# Patient Record
Sex: FEMALE | Race: WHITE | NOT HISPANIC OR LATINO | Employment: UNEMPLOYED | URBAN - METROPOLITAN AREA
[De-identification: names, ages, dates, MRNs, and addresses within clinical notes are randomized per-mention and may not be internally consistent; named-entity substitution may affect disease eponyms.]

---

## 2021-12-13 ENCOUNTER — HOSPITAL ENCOUNTER (EMERGENCY)
Facility: HOSPITAL | Age: 51
Discharge: HOME | End: 2021-12-13
Admitting: EMERGENCY MEDICINE

## 2021-12-13 VITALS
DIASTOLIC BLOOD PRESSURE: 98 MMHG | WEIGHT: 130 LBS | OXYGEN SATURATION: 98 % | RESPIRATION RATE: 18 BRPM | SYSTOLIC BLOOD PRESSURE: 205 MMHG | HEIGHT: 62 IN | HEART RATE: 110 BPM | BODY MASS INDEX: 23.92 KG/M2

## 2021-12-13 DIAGNOSIS — L01.00 IMPETIGO: Primary | ICD-10-CM

## 2021-12-13 PROCEDURE — 99284 EMERGENCY DEPT VISIT MOD MDM: CPT | Performed by: PHYSICIAN ASSISTANT

## 2021-12-13 PROCEDURE — 99283 EMERGENCY DEPT VISIT LOW MDM: CPT

## 2023-03-29 ENCOUNTER — APPOINTMENT (EMERGENCY)
Dept: RADIOLOGY | Facility: HOSPITAL | Age: 53
End: 2023-03-29

## 2023-03-29 ENCOUNTER — HOSPITAL ENCOUNTER (EMERGENCY)
Facility: HOSPITAL | Age: 53
Discharge: HOME | End: 2023-03-29
Attending: EMERGENCY MEDICINE

## 2023-03-29 VITALS
RESPIRATION RATE: 20 BRPM | HEART RATE: 88 BPM | TEMPERATURE: 98.1 F | OXYGEN SATURATION: 98 % | DIASTOLIC BLOOD PRESSURE: 95 MMHG | SYSTOLIC BLOOD PRESSURE: 173 MMHG

## 2023-03-29 DIAGNOSIS — M79.651 PAIN IN RIGHT THIGH: Primary | ICD-10-CM

## 2023-03-29 NOTE — ED PROVIDER NOTES
History  Chief Complaint   Patient presents with   • Groin Pain     Pt states she was on the treadmill about a year ago when she feels she pulled a muscle in her right groin  Pt states today the pain suddenly became worse and now radiates into her right leg      HPI    Prior to Admission Medications   Prescriptions Last Dose Informant Patient Reported? Taking?   mupirocin (BACTROBAN) 2 % ointment   No No   Sig: Apply topically 3 (three) times a day      Facility-Administered Medications: None       History reviewed  No pertinent past medical history  History reviewed  No pertinent surgical history  History reviewed  No pertinent family history  I have reviewed and agree with the history as documented      E-Cigarette/Vaping     E-Cigarette/Vaping Substances   • Nicotine No      Social History     Tobacco Use   • Smoking status: Every Day     Packs/day: 1 00     Types: Cigarettes   • Smokeless tobacco: Never   Substance Use Topics   • Alcohol use: Yes     Comment: couple bers a day   • Drug use: Never       Review of Systems    Physical Exam  Physical Exam    Vital Signs  ED Triage Vitals [03/29/23 0729]   Temperature Pulse Respirations Blood Pressure SpO2   98 1 °F (36 7 °C) (!) 118 18 (!) 195/111 98 %      Temp Source Heart Rate Source Patient Position - Orthostatic VS BP Location FiO2 (%)   Oral Monitor -- Right arm --      Pain Score       --           Vitals:    03/29/23 0729   BP: (!) 195/111   Pulse: (!) 118         Visual Acuity      ED Medications  Medications - No data to display    Diagnostic Studies  Results Reviewed     None                 XR femur 2 views RIGHT    (Results Pending)   XR hip/pelv 2-3 vws right if performed    (Results Pending)              Procedures  Procedures         ED Course                                             MDM    Disposition  Final diagnoses:   None     ED Disposition     None      Follow-up Information    None         Patient's Medications   Discharge Prescriptions    No medications on file       No discharge procedures on file      PDMP Review     None          ED Provider  Electronically Signed by femur 2 views RIGHT   ED Interpretation by Merline Contreras PA-C (03/29 0809)   No acute osseous abnormality      Final Result by Colby Marie MD (03/29 1283)      Severe right hip arthritis   No acute displaced fracture            Workstation performed: [id number]         XR hip/pelv 2-3 vws right if performed   ED Interpretation by Merline Contreras PA-C (03/29 0809)   No acute osseous abnormality      Final Result by Colby Marie MD (03/29 8658)      Severe right hip arthritis   No acute displaced fracture            Workstation performed: [id number]                    Procedures  Procedures         ED Course                                             Medical Decision Making  This is a 61-year-old female arriving with complaint of right inner thigh pain  Symptoms have been ongoing for 1 year, but she has not previously seen another provider this complaint  States that she had initially injured it while working out on her treadmill  No accompanying extremity paresthesias or weakness  Differential diagnosis includes but is not limited to: tendinitis, bursitis, muscle sprain/strain/spasm, osteoarthritis, djd    Initial ED plan: check xrays; offered ibuprofen which patient declined    Final ED Assessment: Vital signs reviewed on ED presentation, initially demonstrating tachycardia which had improved on reassessment of vital signs  Examination as above  The right lower extremity is neurovascularly intact  X-rays reviewed at bedside with patient and spouse  Discussed arthritis of the right hip joint, with no acute findings such as fracture/dislocation  Recommended continued supportive care including weightbearing as tolerated and gentle stretching, avoiding overexertion  Given Ace bandage for compression  Recommended Voltaren gel as needed for topical relief  Encouraged outpatient orthopedic follow-up for further evaluation and management which was included in her discharge paperwork  Parameters for ED return discussed at length  Patient verbalized understanding and was comfortable and agreeable with disposition and care plan  She was discharged in stable condition and had ambulated independently from the ED without issue  Amount and/or Complexity of Data Reviewed  Radiology: ordered and independent interpretation performed  Disposition  Final diagnoses:   Pain in right thigh     Time reflects when diagnosis was documented in both MDM as applicable and the Disposition within this note     Time User Action Codes Description Comment    3/29/2023  8:10 AM Nevaeh Barbosa Add [W28 008] Pain in right thigh       ED Disposition     ED Disposition   Discharge    Condition   Stable    Date/Time   Wed Mar 29, 2023  8:09 AM    Comment   Mario He discharge to home/self care  Follow-up Information     Follow up With Specialties Details Why Contact Info Additional [address] Orthopedic Surgery Call   [address] [id number] [facility], [address] [address] [place], [place], [address] Emergency Department Emergency Medicine  If symptoms worsen [facility] Emergency Department, [address] [place], [place], [facility]          Discharge Medication List as of 3/29/2023  8:11 AM      CONTINUE these medications which have NOT CHANGED    Details   mupirocin (BACTROBAN) 2 % ointment Apply topically 3 (three) times a day, Starting Mon 12/13/2021, Print             No discharge procedures on file      PDMP Review     None          ED Provider  Electronically Signed by           Merline Contreras PA-C  04/03/23 5081

## 2023-03-29 NOTE — DISCHARGE INSTRUCTIONS
Continue supportive care including rest and avoiding overactivity, gentle stretching, Voltaren gel for topical relief, Tylenol and ibuprofen, Ace bandage for compression, elevation  Outpatient PT/Ortho follow-up  Return immediately to the emergency department if you experience any new or worsening symptoms as discussed

## 2025-01-24 ENCOUNTER — OFFICE VISIT (OUTPATIENT)
Dept: OBGYN CLINIC | Facility: CLINIC | Age: 55
End: 2025-01-24
Payer: COMMERCIAL

## 2025-01-24 VITALS — HEIGHT: 62 IN | BODY MASS INDEX: 25.03 KG/M2 | WEIGHT: 136 LBS

## 2025-01-24 DIAGNOSIS — Z98.890 HISTORY OF THUMB SURGERY: Primary | ICD-10-CM

## 2025-01-24 PROCEDURE — 99203 OFFICE O/P NEW LOW 30 MIN: CPT | Performed by: ORTHOPAEDIC SURGERY

## 2025-01-24 RX ORDER — NORTRIPTYLINE HYDROCHLORIDE 10 MG/1
10 CAPSULE ORAL
COMMUNITY
Start: 2025-01-10 | End: 2025-03-11

## 2025-01-24 RX ORDER — LANOLIN ALCOHOL/MO/W.PET/CERES
1 CREAM (GRAM) TOPICAL DAILY
COMMUNITY
Start: 2024-10-18

## 2025-01-24 RX ORDER — GABAPENTIN 300 MG/1
300 CAPSULE ORAL 3 TIMES DAILY
COMMUNITY

## 2025-01-24 NOTE — PROGRESS NOTES
Assessment/Plan:   Diagnoses and all orders for this visit:    History of thumb surgery  -     Ambulatory Referral to Orthopedic Surgery; Future    Other orders  -     gabapentin (NEURONTIN) 300 mg capsule; Take 300 mg by mouth 3 (three) times a day  -     nortriptyline (PAMELOR) 10 mg capsule; Take 10 mg by mouth daily at bedtime Takes 2 tabs  -     vitamin B-12 (VITAMIN B-12) 1,000 mcg tablet; Take 1 tablet by mouth in the morning    The patient has a history of left thumb surgery from August 2024 with an outside provider. The patient continues to experience pain and lacks terminal extension at the MCP joint. Unfortunately, we were not able to obtain updated x-rays at time of visit. The patient was referred to orthopedic hand specialist for further evaluation and treatment.    The patient has hypertrophy along the MCP  joints of her bilateral thumbs.  There is however ulnar collateral ligament laxity more on the left than the right.  She already had surgery.  Would recommend getting a second opinion with an upper extremity specialist.  Look forward to hearing back once this is obtained    Subjective:   Patient ID: Trina Garcia  1970     HPI  Patient is a 54 y.o. female who presents for initial evaluation of her left thumb. The patient states that she had a surgery in August 2024, from  either a torn ligament or tendon, completed by Dr. Lynne at [facility]. The patient continues to have pain and soreness. She has an obvious deformity of the thumb. She denies numbness and tingling.     The following portions of the patient's history were reviewed and updated as appropriate:  Past medical history, past surgical history, Family history, social history, current medications and allergies    History reviewed. No pertinent past medical history.    Past Surgical History:   Procedure Laterality Date    CARPAL TUNNEL RELEASE Right     CERVICAL FUSION N/A 12/10/2024    HAND SURGERY N/A     TOTAL HIP ARTHROPLASTY  Right        No family history on file.    Social History     Socioeconomic History    Marital status:      Spouse name: None    Number of children: None    Years of education: None    Highest education level: None   Occupational History    None   Tobacco Use    Smoking status: Every Day     Current packs/day: 1.00     Types: Cigarettes    Smokeless tobacco: Never   Vaping Use    Vaping status: Some Days   Substance and Sexual Activity    Alcohol use: Yes     Comment: couple bers a day    Drug use: Never    Sexual activity: None   Other Topics Concern    None   Social History Narrative    None     Social Drivers of Health     Financial Resource Strain: Not At Risk (12/10/2024)    Received from [facility]    Financial Resource Strain     In the last 12 months did you skip medications to save money?: No     In the last 12 months, was there a time when you needed to see a doctor but could not because of cost?: No   Recent Concern: Financial Resource Strain - Medium Risk (11/7/2024)    Received from [facility]    Overall Financial Resource Strain (CARDIA)     Difficulty of Paying Living Expenses: Somewhat hard   Food Insecurity: Not At Risk (12/10/2024)    Received from [facility]    Food Insecurity     In the last 12 months did you ever eat less than you felt you should because there wasn't enough money for food?: No   Transportation Needs: Not At Risk (12/10/2024)    Received from [facility]    Transporation     In the last 12 months, have you ever had to go without healthcare because you didn't have a way to get there?: No   Physical Activity: Not on file   Stress: Not on file   Social Connections: Not At Risk (12/10/2024)    Received from [facility]    Social Connections     Do you often feel lonely?: No   Intimate Partner Violence: Not At Risk (11/7/2024)    Received from [facility]    Humiliation, Afraid, Rape, and Kick questionnaire     Fear of Current or Ex-Partner: No     Emotionally Abused: No     Physically Abused: No     Sexually Abused: No   Housing Stability: Not At Risk (12/10/2024)    Received from [facility]    Housing Stability     Are you worried that in the next 2 months you may not have stable housing?: No   Recent Concern: Housing Stability - High Risk (11/7/2024)    Received from [facility]    Housing Stability Vital Sign     Unable to Pay for Housing in the Last Year: Yes     Number of Times Moved in the Last Year: 0     Homeless in the Last Year: No         Current Outpatient Medications:     gabapentin (NEURONTIN) 300 mg capsule, Take 300 mg by mouth 3 (three) times a day, Disp: , Rfl:     nortriptyline (PAMELOR) 10 mg capsule, Take 10 mg by mouth daily at bedtime Takes 2 tabs, Disp: , Rfl:     vitamin B-12 (VITAMIN B-12) 1,000 mcg tablet, Take 1 tablet by mouth in the morning, Disp: , Rfl:     mupirocin (BACTROBAN) 2 % ointment, Apply topically 3 (three) times a day (Patient not taking: Reported on 1/24/2025), Disp: 15 g, Rfl: 0    No Known Allergies    Review of Systems   Constitutional:  Negative for chills, fever and unexpected weight change.   HENT:  Negative for hearing loss, nosebleeds and sore throat.    Eyes:  Negative for pain, redness and visual disturbance.   Respiratory:  Negative for cough, shortness of breath and wheezing.    Cardiovascular:  Negative for chest pain, palpitations and leg swelling.   Gastrointestinal:  Negative for abdominal pain, nausea and vomiting.   Endocrine: Negative for polydipsia and polyuria.   Genitourinary:  Negative for dysuria and hematuria.   Skin:  Negative for rash and wound.   Neurological:  Negative for dizziness, numbness and headaches.   Psychiatric/Behavioral:  Negative for decreased concentration and suicidal ideas. The patient is not nervous/anxious.    All other  "systems reviewed and are negative.       Objective:  Ht 5' 2\" (1.575 m)   Wt 61.7 kg (136 lb)   BMI 24.87 kg/m²     Ortho Exam  left Hand -    Skin is warm and dry to touch with no signs of erythema, ecchymosis, or infection  No soft tissue swelling or effusion noted  Full FDS, FDP, extensor mechanisms are intact  No rotational deformity with composite finger flexion  TTP over MCP joint, ulnar collateral ligament  Laxity with valgus stress.   Lack of terminal extension  Demonstrates normal wrist, elbow, and shoulder motion  Forearm compartments are soft and supple  2+ distal radial pulse with brisk capillary refill to the fingers  Radial, median, and ulnar motor and sensory distribution intact  Sensations light to touch intact distally      Physical Exam  HENT:      Head: Normocephalic and atraumatic.      Nose: Nose normal.   Eyes:      Conjunctiva/sclera: Conjunctivae normal.   Cardiovascular:      Rate and Rhythm: Normal rate.   Pulmonary:      Effort: Pulmonary effort is normal.   Musculoskeletal:      Cervical back: Neck supple.   Skin:     General: Skin is warm and dry.      Capillary Refill: Capillary refill takes less than 2 seconds.   Neurological:      Mental Status: She is alert and oriented to person, place, and time.   Psychiatric:         Mood and Affect: Mood normal.         Behavior: Behavior normal.          Diagnostic Test Review:  No imaging available at time of visit.     Procedures   None performed.     Scribe Attestation      I,:  Shirley Bradley am acting as a scribe while in the presence of the attending physician.:       I,:  Jaylon Grace, DO personally performed the services described in this documentation    as scribed in my presence.:              "

## 2025-03-05 ENCOUNTER — APPOINTMENT (OUTPATIENT)
Dept: RADIOLOGY | Facility: CLINIC | Age: 55
End: 2025-03-05
Payer: COMMERCIAL

## 2025-03-05 ENCOUNTER — OFFICE VISIT (OUTPATIENT)
Dept: OBGYN CLINIC | Facility: CLINIC | Age: 55
End: 2025-03-05
Payer: COMMERCIAL

## 2025-03-05 VITALS — BODY MASS INDEX: 25.03 KG/M2 | WEIGHT: 136 LBS | HEIGHT: 62 IN

## 2025-03-05 DIAGNOSIS — S63.629A COMPLETE TEAR OF RADIAL COLLATERAL LIGAMENT OF INTERPHALANGEAL JOINT OF THUMB: ICD-10-CM

## 2025-03-05 DIAGNOSIS — Z98.890 HISTORY OF THUMB SURGERY: ICD-10-CM

## 2025-03-05 DIAGNOSIS — S63.629A COMPLETE TEAR OF RADIAL COLLATERAL LIGAMENT OF INTERPHALANGEAL JOINT OF THUMB: Primary | ICD-10-CM

## 2025-03-05 PROCEDURE — 99203 OFFICE O/P NEW LOW 30 MIN: CPT | Performed by: SURGERY

## 2025-03-05 PROCEDURE — 73130 X-RAY EXAM OF HAND: CPT

## 2025-03-05 NOTE — PROGRESS NOTES
Hand Surgery History and Physical    03/05/25  2:44 PM  [id number]  Trina Garcia      HPI:  Pt is a 55 yo female who presents for evaluation of her left hand.  She has surgery in August 2024 which documents indicate was a left thumb RCL reconstruction with palmaris allograft.  She notes deformity of the thumb MCP joint along with discomfort.  She notes that she is not able to use her thumb very well.  She notes that her thumb was not this way prior to her surgery.      History reviewed. No pertinent past medical history.    Past Surgical History:   Procedure Laterality Date    CARPAL TUNNEL RELEASE Right     CERVICAL FUSION N/A 12/10/2024    HAND SURGERY N/A     TOTAL HIP ARTHROPLASTY Right        History reviewed. No pertinent family history.    Review of Systems - General ROS: negative  Ophthalmic ROS: negative  ENT ROS: negative  Respiratory ROS: negative  Cardiovascular ROS: negative  Neurological ROS: negative  Dermatological ROS: negative    There were no vitals filed for this visit.    Physical Examination:  General:  NAD  HEENT:  EOMI, perrla, normal ear morphology  Chest:  Unlabored breathing  Heart:  Regular pulse  Abd:  No distension  Extrem: LUE:  thumb MCP joint resting in flexion and ulnarly deviated, crepitus present on manipulation of MCP joint, able to reduce joint into more normal anatomic position but then subluxes back into above position, normal thumb IP flexion and extension, no active thumb MCP extension; scar over dorsal MCP joint level, SILT  Skin:  Dry, pink  Neuro:  AAOx3     Encounter Diagnoses   Name Primary?    History of thumb surgery     Complete tear of radial collateral ligament of interphalangeal joint of thumb Yes     Return if symptoms worsen or fail to improve.      Assessment & Plan  History of thumb surgery    Complete tear of radial collateral ligament of interphalangeal joint of thumb  Thumb is unstable at the MCP joint.    Plain films obtained and independently  reviewed:  MCP joint subluxed volarly and ulnarly, some mild arthritic changes, cystic changes consistent with tendon graft reconstruction on the ulnar side of the thumb metacarpal and proximal phalanx  Discussed treatment options.  Will start with a hand based splint to better position the thumb for daily activities. Therapy order placed for splint fabrication.   Discussed thumb MCP joint fusion as the main option in the future.    NSAIDs as needed for discomfort.    F/U PRN.

## 2025-03-06 ENCOUNTER — OFFICE VISIT (OUTPATIENT)
Dept: OCCUPATIONAL THERAPY | Facility: CLINIC | Age: 55
End: 2025-03-06
Payer: COMMERCIAL

## 2025-03-06 DIAGNOSIS — S63.629A COMPLETE TEAR OF RADIAL COLLATERAL LIGAMENT OF INTERPHALANGEAL JOINT OF THUMB: ICD-10-CM

## 2025-03-06 PROCEDURE — L3913 HFO W/O JOINTS CF: HCPCS

## 2025-03-06 NOTE — PROGRESS NOTES
Orthosis    Diagnosis:   1. Complete tear of radial collateral ligament of interphalangeal joint of thumb  Ambulatory Referral to PT/OT Hand Therapy        Indication:  Deformity of MCP joint    Location: Left  hand and thumb  Supplies: Custom Fit Orthotic  Orthosis type: Hand-Based SPICA  Wearing Schedule: Day Time as Tolerated  Describe Position: MP in slight extension and deviated to neutral in functional mid-abducted position    Precautions: Universal (skin contact/breakdown)    Patient or Caregiver expresses understanding of wearing Schedule and Precautions? Yes  Patient or Caregiver able to don/doff orthotic independently?Yes    Written orders provided to patient? Yes  Orders Obtained: Written  Orders Obtained from: Dr. Benítez    Return for evaluation and treatment  Pending f/u with surgeon

## 2025-05-14 ENCOUNTER — OFFICE VISIT (OUTPATIENT)
Dept: OBGYN CLINIC | Facility: CLINIC | Age: 55
End: 2025-05-14
Payer: COMMERCIAL

## 2025-05-14 VITALS — BODY MASS INDEX: 25.03 KG/M2 | HEIGHT: 62 IN | WEIGHT: 136 LBS

## 2025-05-14 DIAGNOSIS — S63.659A COMPLETE TEAR OF RADIAL COLLATERAL LIGAMENT OF METACARPOPHALANGEAL (MCP) JOINT OF FINGER: Primary | ICD-10-CM

## 2025-05-14 PROCEDURE — 99214 OFFICE O/P EST MOD 30 MIN: CPT | Performed by: SURGERY

## 2025-05-14 RX ORDER — SODIUM CHLORIDE, SODIUM LACTATE, POTASSIUM CHLORIDE, CALCIUM CHLORIDE 600; 310; 30; 20 MG/100ML; MG/100ML; MG/100ML; MG/100ML
20 INJECTION, SOLUTION INTRAVENOUS CONTINUOUS
OUTPATIENT
Start: 2025-05-14

## 2025-05-14 NOTE — PROGRESS NOTES
Hand Surgery History and Physical    05/14/25  1:11 PM  [id number]  Trina Garcia      HPI:  Pt is a 53 yo female who presents for follow-up evaluation of her left hand.  She has surgery in August 2024 which documents indicate was a left thumb RCL reconstruction with palmaris allograft by Dr. Lynne. Last visit a hand based splint was recommended.  Today she states the hand-based splint has worsened her thumb discomfort.  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    She is right-handed.    History reviewed. No pertinent past medical history.    Past Surgical History:   Procedure Laterality Date    CARPAL TUNNEL RELEASE Right     CERVICAL FUSION N/A 12/10/2024    HAND SURGERY N/A     TOTAL HIP ARTHROPLASTY Right        History reviewed. No pertinent family history.    Review of Systems - General ROS: negative  Ophthalmic ROS: negative  ENT ROS: negative  Respiratory ROS: negative  Cardiovascular ROS: negative  Neurological ROS: negative  Dermatological ROS: negative    Vitals:       Physical Examination:  General:  NAD  HEENT:  EOMI, perrla, normal ear morphology  Chest:  Unlabored breathing  Heart:  Regular pulse  Abd:  No distension  Extrem: LUE:  thumb MCP joint resting in flexion and ulnarly deviated, crepitus present on manipulation of MCP joint, able to reduce joint into more normal anatomic position but then subluxes back into above position, normal thumb IP flexion and extension, no active thumb MCP extension; scar over dorsal MCP joint level, SILT  Skin:  Dry, pink  Neuro:  AAOx3     Assessment & Plan  Complete tear of radial collateral ligament of metacarpophalangeal (MCP) joint of finger  Left thumb is unstable at MCP joint.  Recommend surgery - left thumb MCP joint fusion - given her functional loss and continued pain.  Patient wishes to pursue this option.  Risks, benefits and alternative treatments were discussed with the patient. These included but  are not limited to: bleeding, infection, damage to nerves, vessels or tendons, allergic reaction to agents, possible increase in pain, tendon or ligament rupture, weakening of bone or soft tissues, and/or elevation in blood sugar. Patient understands and would like to proceed with the proposed procedure.    F/U 2 weeks after surgery.

## 2025-05-22 ENCOUNTER — PREP FOR PROCEDURE (OUTPATIENT)
Dept: OBGYN CLINIC | Facility: CLINIC | Age: 55
End: 2025-05-22

## 2025-05-23 ENCOUNTER — ANESTHESIA EVENT (OUTPATIENT)
Age: 55
End: 2025-05-23
Payer: COMMERCIAL

## 2025-05-27 ENCOUNTER — OFFICE VISIT (OUTPATIENT)
Dept: LAB | Facility: HOSPITAL | Age: 55
End: 2025-05-27
Attending: SURGERY
Payer: COMMERCIAL

## 2025-05-27 ENCOUNTER — APPOINTMENT (OUTPATIENT)
Dept: LAB | Facility: HOSPITAL | Age: 55
End: 2025-05-27
Payer: COMMERCIAL

## 2025-05-27 DIAGNOSIS — S63.659A COMPLETE TEAR OF RADIAL COLLATERAL LIGAMENT OF METACARPOPHALANGEAL (MCP) JOINT OF FINGER: ICD-10-CM

## 2025-05-27 DIAGNOSIS — G95.9 MYELOPATHY (HCC): ICD-10-CM

## 2025-05-27 LAB
ANION GAP SERPL CALCULATED.3IONS-SCNC: 9 MMOL/L (ref 4–13)
ATRIAL RATE: 90 BPM
BUN SERPL-MCNC: 15 MG/DL (ref 5–25)
CALCIUM SERPL-MCNC: 10.2 MG/DL (ref 8.4–10.2)
CHLORIDE SERPL-SCNC: 103 MMOL/L (ref 96–108)
CO2 SERPL-SCNC: 30 MMOL/L (ref 21–32)
CREAT SERPL-MCNC: 0.82 MG/DL (ref 0.6–1.3)
ERYTHROCYTE [DISTWIDTH] IN BLOOD BY AUTOMATED COUNT: 13 % (ref 11.6–15.1)
FOLATE SERPL-MCNC: 7.7 NG/ML
GFR SERPL CREATININE-BSD FRML MDRD: 81 ML/MIN/1.73SQ M
GLUCOSE P FAST SERPL-MCNC: 96 MG/DL (ref 65–99)
HCT VFR BLD AUTO: 37 % (ref 34.8–46.1)
HGB BLD-MCNC: 12 G/DL (ref 11.5–15.4)
MCH RBC QN AUTO: 30.9 PG (ref 26.8–34.3)
MCHC RBC AUTO-ENTMCNC: 32.4 G/DL (ref 31.4–37.4)
MCV RBC AUTO: 95 FL (ref 82–98)
P AXIS: 41 DEGREES
PLATELET # BLD AUTO: 270 THOUSANDS/UL (ref 149–390)
PMV BLD AUTO: 9.5 FL (ref 8.9–12.7)
POTASSIUM SERPL-SCNC: 4.6 MMOL/L (ref 3.5–5.3)
PR INTERVAL: 160 MS
QRS AXIS: 23 DEGREES
QRSD INTERVAL: 88 MS
QT INTERVAL: 362 MS
QTC INTERVAL: 443 MS
RBC # BLD AUTO: 3.88 MILLION/UL (ref 3.81–5.12)
SODIUM SERPL-SCNC: 142 MMOL/L (ref 135–147)
T WAVE AXIS: 49 DEGREES
VENTRICULAR RATE: 90 BPM
VIT B12 SERPL-MCNC: 173 PG/ML (ref 180–914)
WBC # BLD AUTO: 5.14 THOUSAND/UL (ref 4.31–10.16)

## 2025-05-27 PROCEDURE — 82746 ASSAY OF FOLIC ACID SERUM: CPT

## 2025-05-27 PROCEDURE — 93010 ELECTROCARDIOGRAM REPORT: CPT | Performed by: INTERNAL MEDICINE

## 2025-05-27 PROCEDURE — 85027 COMPLETE CBC AUTOMATED: CPT

## 2025-05-27 PROCEDURE — 80048 BASIC METABOLIC PNL TOTAL CA: CPT

## 2025-05-27 PROCEDURE — 83918 ORGANIC ACIDS TOTAL QUANT: CPT

## 2025-05-27 PROCEDURE — 93005 ELECTROCARDIOGRAM TRACING: CPT

## 2025-05-27 PROCEDURE — 36415 COLL VENOUS BLD VENIPUNCTURE: CPT

## 2025-05-27 PROCEDURE — 82607 VITAMIN B-12: CPT

## 2025-05-28 ENCOUNTER — TELEPHONE (OUTPATIENT)
Dept: OBGYN CLINIC | Facility: CLINIC | Age: 55
End: 2025-05-28

## 2025-05-30 LAB — METHYLMALONATE SERPL-SCNC: 267 NMOL/L (ref 0–378)

## 2025-06-04 RX ORDER — ATORVASTATIN CALCIUM 10 MG/1
10 TABLET, FILM COATED ORAL 3 TIMES WEEKLY
COMMUNITY
Start: 2025-05-13

## 2025-06-04 RX ORDER — ACETAMINOPHEN 500 MG
500 TABLET ORAL EVERY 6 HOURS PRN
COMMUNITY

## 2025-06-04 RX ORDER — METHOCARBAMOL 750 MG/1
750 TABLET, FILM COATED ORAL AS NEEDED
Status: ON HOLD | COMMUNITY
Start: 2025-05-08 | End: 2025-06-06 | Stop reason: ALTCHOICE

## 2025-06-04 NOTE — PRE-PROCEDURE INSTRUCTIONS
Pre-Surgery Instructions:   Medication Instructions    acetaminophen (TYLENOL) 500 mg tablet Uses PRN- OK to take day of surgery    Cholecalciferol (VITAMIN D-3 PO) Last dose 6/4/25    gabapentin (NEURONTIN) 300 mg capsule Take day of surgery.    methocarbamol (ROBAXIN) 750 mg tablet Uses PRN- OK to take day of surgery    nortriptyline (PAMELOR) 10 mg capsule Take night before surgery    vitamin B-12 (VITAMIN B-12) 1,000 mcg tablet Hold day of surgery.takes per Neurology    Medication instructions for day of surgery reviewed. Please take all instructed medications with only a sip of water. Please do not take any over the counter (non-prescribed) vitamins or supplements for one week prior to date of surgery.      You will receive a call one business day prior to surgery with an arrival time and hospital directions. If your surgery is scheduled on a Monday, the hospital will be calling you on the Friday prior to your surgery. If you have not heard from anyone by 8pm, please call the hospital supervisor through the hospital  at [phone]. ([place] [phone] or [place] [phone]).    Do not eat or drink anything after midnight the night before your surgery, including candy, mints, lifesavers, or chewing gum. Do not drink alcohol 24hrs before your surgery. Try not to smoke at least 24hrs before your surgery.       Follow the pre surgery showering instructions as listed in the “My Surgical Experience Booklet” or otherwise provided by your surgeon's office. Do not use a blade to shave the surgical area 1 week before surgery. It is okay to use a clean electric clippers up to 24 hours before surgery. Do not apply any lotions, creams, including makeup, cologne, deodorant, or perfumes after showering on the day of your surgery. Do not use dry shampoo, hair spray, hair gel, or any type of hair products.     No contact lenses, eye make-up, or artificial eyelashes. Remove nail polish, including gel polish, and  any artificial, gel, or acrylic nails if possible. Remove all jewelry including rings and body piercing jewelry.     Wear causal clothing that is easy to take on and off. Consider your type of surgery.    Keep any valuables, jewelry, piercings at home. Please bring any specially ordered equipment (sling, braces) if indicated.    Arrange for a responsible person to drive you to and from the hospital on the day of your surgery. Please confirm the visitor policy for the day of your procedure when you receive your phone call with an arrival time.     Call the surgeon's office with any new illnesses, exposures, or additional questions prior to surgery.    Please reference your “My Surgical Experience Booklet” for additional information to prepare for your upcoming surgery.

## 2025-06-06 ENCOUNTER — APPOINTMENT (OUTPATIENT)
Age: 55
End: 2025-06-06
Payer: COMMERCIAL

## 2025-06-06 ENCOUNTER — HOSPITAL ENCOUNTER (OUTPATIENT)
Age: 55
Setting detail: OUTPATIENT SURGERY
Discharge: HOME | End: 2025-06-06
Attending: SURGERY | Admitting: SURGERY
Payer: COMMERCIAL

## 2025-06-06 ENCOUNTER — ANESTHESIA (OUTPATIENT)
Age: 55
End: 2025-06-06
Payer: COMMERCIAL

## 2025-06-06 VITALS
WEIGHT: 130.2 LBS | HEIGHT: 61 IN | BODY MASS INDEX: 24.58 KG/M2 | TEMPERATURE: 97.4 F | DIASTOLIC BLOOD PRESSURE: 97 MMHG | SYSTOLIC BLOOD PRESSURE: 144 MMHG | RESPIRATION RATE: 16 BRPM | OXYGEN SATURATION: 95 % | HEART RATE: 84 BPM

## 2025-06-06 DIAGNOSIS — S63.642D RUPTURE OF RADIAL COLLATERAL LIGAMENT OF LEFT THUMB, SUBSEQUENT ENCOUNTER: Primary | ICD-10-CM

## 2025-06-06 PROBLEM — R90.82 WHITE MATTER ABNORMALITY ON MRI OF BRAIN: Status: ACTIVE | Noted: 2025-02-04

## 2025-06-06 PROBLEM — F17.210 CIGARETTE NICOTINE DEPENDENCE WITHOUT COMPLICATION: Status: ACTIVE | Noted: 2023-03-30

## 2025-06-06 PROBLEM — R03.0 ELEVATED BP WITHOUT DIAGNOSIS OF HYPERTENSION: Status: ACTIVE | Noted: 2023-03-30

## 2025-06-06 PROBLEM — S63.642A RUPTURE OF RADIAL COLLATERAL LIGAMENT OF LEFT THUMB: Status: ACTIVE | Noted: 2024-08-23

## 2025-06-06 PROBLEM — E28.319 PREMATURE MENOPAUSE: Status: ACTIVE | Noted: 2023-03-30

## 2025-06-06 PROBLEM — M16.11 ARTHRITIS OF RIGHT HIP: Status: ACTIVE | Noted: 2023-03-30

## 2025-06-06 PROBLEM — F10.10 ETOH ABUSE: Status: ACTIVE | Noted: 2023-03-30

## 2025-06-06 PROBLEM — M50.30 DEGENERATIVE DISC DISEASE, CERVICAL: Status: ACTIVE | Noted: 2024-10-09

## 2025-06-06 PROBLEM — Z98.1 S/P CERVICAL SPINAL FUSION: Status: ACTIVE | Noted: 2024-12-10

## 2025-06-06 PROBLEM — G95.9 CERVICAL MYELOPATHY (HCC): Chronic | Status: ACTIVE | Noted: 2024-10-01

## 2025-06-06 PROBLEM — Z96.641 STATUS POST TOTAL HIP REPLACEMENT, RIGHT: Status: ACTIVE | Noted: 2023-05-17

## 2025-06-06 PROBLEM — G56.00 CARPAL TUNNEL SYNDROME: Status: ACTIVE | Noted: 2023-09-13

## 2025-06-06 PROBLEM — Z98.890 STATUS POST LUMBAR SPINE SURGERY FOR DECOMPRESSION OF SPINAL CORD: Status: ACTIVE | Noted: 2024-12-10

## 2025-06-06 PROCEDURE — 73140 X-RAY EXAM OF FINGER(S): CPT

## 2025-06-06 PROCEDURE — NC001 PR NO CHARGE: Performed by: SURGERY

## 2025-06-06 PROCEDURE — C1713 ANCHOR/SCREW BN/BN,TIS/BN: HCPCS | Performed by: SURGERY

## 2025-06-06 PROCEDURE — 64721 CARPAL TUNNEL SURGERY: CPT | Performed by: PHYSICIAN ASSISTANT

## 2025-06-06 PROCEDURE — 64718 REVISE ULNAR NERVE AT ELBOW: CPT | Performed by: SURGERY

## 2025-06-06 PROCEDURE — 26850 FUSION OF KNUCKLE: CPT | Performed by: SURGERY

## 2025-06-06 PROCEDURE — 64721 CARPAL TUNNEL SURGERY: CPT | Performed by: SURGERY

## 2025-06-06 PROCEDURE — 26850 FUSION OF KNUCKLE: CPT | Performed by: PHYSICIAN ASSISTANT

## 2025-06-06 PROCEDURE — 64718 REVISE ULNAR NERVE AT ELBOW: CPT | Performed by: PHYSICIAN ASSISTANT

## 2025-06-06 DEVICE — 3.0MM HEADLESS COMPRESSION SCREW-LONG THREAD 34MM: Type: IMPLANTABLE DEVICE | Site: FINGER | Status: FUNCTIONAL

## 2025-06-06 RX ORDER — DEXAMETHASONE SODIUM PHOSPHATE 10 MG/ML
INJECTION, SOLUTION INTRAMUSCULAR; INTRAVENOUS AS NEEDED
Status: DISCONTINUED | OUTPATIENT
Start: 2025-06-06 | End: 2025-06-06

## 2025-06-06 RX ORDER — BUPIVACAINE HYDROCHLORIDE 2.5 MG/ML
INJECTION, SOLUTION EPIDURAL; INFILTRATION; INTRACAUDAL; PERINEURAL AS NEEDED
Status: DISCONTINUED | OUTPATIENT
Start: 2025-06-06 | End: 2025-06-06 | Stop reason: HOSPADM

## 2025-06-06 RX ORDER — PROPOFOL 10 MG/ML
INJECTION, EMULSION INTRAVENOUS AS NEEDED
Status: DISCONTINUED | OUTPATIENT
Start: 2025-06-06 | End: 2025-06-06

## 2025-06-06 RX ORDER — ACETAMINOPHEN 325 MG/1
650 TABLET ORAL EVERY 6 HOURS PRN
Status: DISCONTINUED | OUTPATIENT
Start: 2025-06-06 | End: 2025-06-06 | Stop reason: HOSPADM

## 2025-06-06 RX ORDER — MAGNESIUM HYDROXIDE 1200 MG/15ML
LIQUID ORAL AS NEEDED
Status: DISCONTINUED | OUTPATIENT
Start: 2025-06-06 | End: 2025-06-06 | Stop reason: HOSPADM

## 2025-06-06 RX ORDER — SODIUM CHLORIDE, SODIUM LACTATE, POTASSIUM CHLORIDE, CALCIUM CHLORIDE 600; 310; 30; 20 MG/100ML; MG/100ML; MG/100ML; MG/100ML
20 INJECTION, SOLUTION INTRAVENOUS CONTINUOUS
Status: DISCONTINUED | OUTPATIENT
Start: 2025-06-06 | End: 2025-06-06 | Stop reason: HOSPADM

## 2025-06-06 RX ORDER — OXYCODONE HYDROCHLORIDE 5 MG/1
5 TABLET ORAL EVERY 4 HOURS PRN
Refills: 0 | Status: DISCONTINUED | OUTPATIENT
Start: 2025-06-06 | End: 2025-06-06 | Stop reason: HOSPADM

## 2025-06-06 RX ORDER — FENTANYL CITRATE 50 UG/ML
INJECTION, SOLUTION INTRAMUSCULAR; INTRAVENOUS AS NEEDED
Status: DISCONTINUED | OUTPATIENT
Start: 2025-06-06 | End: 2025-06-06

## 2025-06-06 RX ORDER — OXYCODONE AND ACETAMINOPHEN 5; 325 MG/1; MG/1
1 TABLET ORAL EVERY 12 HOURS PRN
Qty: 5 TABLET | Refills: 0 | Status: SHIPPED | OUTPATIENT
Start: 2025-06-06

## 2025-06-06 RX ORDER — NAPROXEN 500 MG/1
500 TABLET ORAL 2 TIMES DAILY WITH MEALS
Qty: 10 TABLET | Refills: 0 | Status: SHIPPED | OUTPATIENT
Start: 2025-06-06 | End: 2025-06-18 | Stop reason: SDUPTHER

## 2025-06-06 RX ORDER — FENTANYL CITRATE/PF 50 MCG/ML
50 SYRINGE (ML) INJECTION
Status: DISCONTINUED | OUTPATIENT
Start: 2025-06-06 | End: 2025-06-06 | Stop reason: HOSPADM

## 2025-06-06 RX ORDER — ALBUTEROL SULFATE 0.83 MG/ML
2.5 SOLUTION RESPIRATORY (INHALATION) ONCE AS NEEDED
Status: DISCONTINUED | OUTPATIENT
Start: 2025-06-06 | End: 2025-06-06 | Stop reason: HOSPADM

## 2025-06-06 RX ORDER — MIDAZOLAM HYDROCHLORIDE 2 MG/2ML
INJECTION, SOLUTION INTRAMUSCULAR; INTRAVENOUS AS NEEDED
Status: DISCONTINUED | OUTPATIENT
Start: 2025-06-06 | End: 2025-06-06

## 2025-06-06 RX ORDER — ONDANSETRON 2 MG/ML
INJECTION INTRAMUSCULAR; INTRAVENOUS AS NEEDED
Status: DISCONTINUED | OUTPATIENT
Start: 2025-06-06 | End: 2025-06-06

## 2025-06-06 RX ORDER — ONDANSETRON 2 MG/ML
4 INJECTION INTRAMUSCULAR; INTRAVENOUS ONCE AS NEEDED
Status: DISCONTINUED | OUTPATIENT
Start: 2025-06-06 | End: 2025-06-06 | Stop reason: HOSPADM

## 2025-06-06 RX ORDER — LIDOCAINE HYDROCHLORIDE 10 MG/ML
INJECTION, SOLUTION EPIDURAL; INFILTRATION; INTRACAUDAL; PERINEURAL AS NEEDED
Status: DISCONTINUED | OUTPATIENT
Start: 2025-06-06 | End: 2025-06-06

## 2025-06-06 RX ADMIN — SODIUM CHLORIDE, SODIUM LACTATE, POTASSIUM CHLORIDE, AND CALCIUM CHLORIDE: .6; .31; .03; .02 INJECTION, SOLUTION INTRAVENOUS at 12:18

## 2025-06-06 RX ADMIN — PROPOFOL 150 MG: 10 INJECTION, EMULSION INTRAVENOUS at 11:21

## 2025-06-06 RX ADMIN — LIDOCAINE HYDROCHLORIDE 50 MG: 10 INJECTION, SOLUTION EPIDURAL; INFILTRATION; INTRACAUDAL; PERINEURAL at 11:21

## 2025-06-06 RX ADMIN — DEXMEDETOMIDINE HYDROCHLORIDE 8 MCG: 100 INJECTION, SOLUTION INTRAVENOUS at 11:21

## 2025-06-06 RX ADMIN — FENTANYL CITRATE 25 MCG: 50 INJECTION INTRAMUSCULAR; INTRAVENOUS at 11:33

## 2025-06-06 RX ADMIN — ONDANSETRON 4 MG: 2 INJECTION INTRAMUSCULAR; INTRAVENOUS at 11:32

## 2025-06-06 RX ADMIN — OXYCODONE HYDROCHLORIDE 5 MG: 5 TABLET ORAL at 13:42

## 2025-06-06 RX ADMIN — DEXAMETHASONE SODIUM PHOSPHATE 5 MG: 10 INJECTION INTRAMUSCULAR; INTRAVENOUS at 11:32

## 2025-06-06 RX ADMIN — MIDAZOLAM 2 MG: 1 INJECTION INTRAMUSCULAR; INTRAVENOUS at 11:18

## 2025-06-06 RX ADMIN — FENTANYL CITRATE 50 MCG: 50 INJECTION INTRAMUSCULAR; INTRAVENOUS at 11:18

## 2025-06-06 RX ADMIN — SODIUM CHLORIDE, SODIUM LACTATE, POTASSIUM CHLORIDE, AND CALCIUM CHLORIDE 20 ML/HR: .6; .31; .03; .02 INJECTION, SOLUTION INTRAVENOUS at 09:31

## 2025-06-06 NOTE — ANESTHESIA PREPROCEDURE EVALUATION
Procedure:  LEFT THUMB METACARPOPHALANGEAL JOINT ARTHRODESIS, LT CTR & CUBITAL (Left: Finger)    Relevant Problems   MUSCULOSKELETAL   (+) Arthritis of right hip      Behavioral Health   (+) Cigarette nicotine dependence without complication   (+) ETOH abuse      Obstetrics/Gynecology   (+) Premature menopause      Neurology/Sleep   (+) Cervical myelopathy (HCC)   (+) S/P cervical spinal fusion   (+) Status post lumbar spine surgery for decompression of spinal cord   (+) White matter abnormality on MRI of brain      Rheumatology   (+) Complete tear of radial collateral ligament of metacarpophalangeal (MCP) joint of finger   (+) Degenerative disc disease, cervical   (+) Rupture of radial collateral ligament of left thumb      Orthopedic/Musculoskeletal   (+) Carpal tunnel syndrome   (+) Status post total hip replacement, right      Other   (+) Elevated BP without diagnosis of hypertension      Lab Results   Component Value Date    SODIUM 142 05/27/2025    K 4.6 05/27/2025     05/27/2025    CO2 30 05/27/2025    AGAP 9 05/27/2025    BUN 15 05/27/2025    CREATININE 0.82 05/27/2025    GLUC 121 (H) 11/26/2024    GLUF 96 05/27/2025    CALCIUM 10.2 05/27/2025    AST 17 10/16/2024    ALT 10 10/16/2024    ALKPHOS 71 10/16/2024    TP 7.6 10/16/2024    TBILI 0.4 10/16/2024    EGFR 81 05/27/2025     Lab Results   Component Value Date    WBC 5.14 05/27/2025    HGB 12.0 05/27/2025    HCT 37.0 05/27/2025    MCV 95 05/27/2025     05/27/2025         Physical Exam    Airway     Mallampati score: II  TM Distance: >3 FB  Neck ROM: full      Cardiovascular      Dental       Pulmonary      Neurological      Other Findings  post-pubertal.      Anesthesia Plan  ASA Score- 2     Anesthesia Type- general with ASA Monitors.         Additional Monitors:     Airway Plan: LMA and LMA.           Plan Factors-Exercise tolerance (METS): >4 METS.    Chart reviewed. EKG reviewed. Imaging results reviewed. Existing labs reviewed. Patient  summary reviewed.                  Induction-     Postoperative Plan- .   Monitoring Plan - Monitoring plan - standard ASA monitoring          Informed Consent- Anesthetic plan and risks discussed with patient.  I personally reviewed this patient with the CRNA. Discussed and agreed on the Anesthesia Plan with the CRNA..      NPO Status:  Vitals Value Taken Time   Date of last liquid 06/05/25 06/06/25 09:09   Time of last liquid 0730 06/06/25 09:09   Date of last solid 06/05/25 06/06/25 09:09   Time of last solid 2230 06/06/25 09:09

## 2025-06-06 NOTE — DISCHARGE INSTR - AVS FIRST PAGE
Special Instructions:  Rest left hand and elevate frequently.  Do not remove splint. All dressings/bandages to remain intact until your follow-up appointment.    OK to move the elbow.  May shower with protective cover on left hand.  Do not submerge.  No driving.  Follow-up in the office per your scheduled post-op appointment.

## 2025-06-06 NOTE — ANESTHESIA POSTPROCEDURE EVALUATION
EMERGENCY DEPARTMENT HISTORY AND PHYSICAL EXAM    2:34 PM      Date: 3/22/2025  Patient Name: Erika Haro    History of Presenting Illness     Chief Complaint   Patient presents with    Spasms     Pt arrives alert oriented via stretcher by EMS. C/o muscle spasms. PT was working out in the gym when she bean to cramp up.   Pt lakisha to speak however unable to move her lips and arms have been unable to go from straight out palm down position. Pt denies any falls or injuries        History From: Patient    Erika Haro is a 31 y.o. female   Patient is a 31-year-old female that denies any chance of being pregnant the presents emergency department with complaint of breathing rapidly and feeling numbness tingling in her arms or legs with twitching in her hands and feet while working out.  Patient such as doing squats, started feel bad, and is her breathing fast and could not move.  EMS was called and found the patient to have spasms of her hand and feet and has a difficult time speaking.  The patient also some numbness in her face.  The patient says has had before and denies a headache.  Patient says her symptoms started to slowly get better and denies any other aggravating alleviating factors.  Patient denies being a smoker, drinker, no drug user.  The patient was working out at her apartment complex.         Nursing Notes were all reviewed and agreed with or any disagreements were addressed in the HPI.    PCP: Tish Martin, ELISABETH - NP    Current Facility-Administered Medications   Medication Dose Route Frequency Provider Last Rate Last Admin    midazolam PF (VERSED) injection 1 mg  1 mg IntraVENous NOW Jarred Lee MD        sodium chloride 0.9 % bolus 1,000 mL  1,000 mL IntraVENous Once Jarred Lee .6 mL/hr at 03/22/25 1407 1,000 mL at 03/22/25 1407    0.9 % sodium chloride infusion   IntraVENous Continuous Jarred Lee MD         Current Outpatient Medications  Post-Op Assessment Note    CV Status:  Stable    Pain management: adequate       Mental Status:  Alert and awake   Hydration Status:  Euvolemic   PONV Controlled:  Controlled   Airway Patency:  Patent     Post Op Vitals Reviewed: Yes    No anethesia notable event occurred.    Staff: Anesthesiologist           Last Filed PACU Vitals:  Vitals Value Taken Time   Temp 97.4 °F (36.3 °C) 06/06/25 13:05   Pulse 84 06/06/25 13:05   /97 06/06/25 13:05   Resp 16 06/06/25 13:05   SpO2 95 % 06/06/25 13:05       Modified Elieser:     Vitals Value Taken Time   Activity 2 06/06/25 13:05   Respiration 2 06/06/25 13:05   Circulation 2 06/06/25 13:05   Consciousness 2 06/06/25 13:05   Oxygen Saturation 2 06/06/25 13:05     Modified Elieser Score: 10               No orders to display         Medical Decision Making   I am the first provider for this patient.    I reviewed the vital signs, available nursing notes, past medical history, past surgical history, family history and social history.    Vital Signs-Reviewed the patient's vital signs.      EKG: NSR at 89, no stemi, interpreted by me       ED Course: Progress Notes, Reevaluation, and Consults:    Provider Notes (Medical Decision Making):     MDM  Number of Diagnoses or Management Options  Carpopedal spasm  Hyperventilation  Near syncope  Diagnosis management comments: Patient is a 31-year-old female with a history of pregnancy in the past, migraines, the presents emergency department with complaint of arm and leg numbness and contractures after becoming short of breath.  Patient appeared to have carpopedal spasm likely from panic attack.  Will follow patient's electrolytes, basic labs, hydrate, give a dose of Versed, and then reevaluate.         The patient's symptoms are completely resolved so Versed was not needed according to the nurse.  Will continue to follow the patient closely.    Patient notes that she was doing a workout and was working up about 30 minutes was  on the elliptical machine.  She started to do some weights and started to feel lightheaded.  She said she weak and did not feel herself so she went to the car and called her child's father who works out frequently.  She was told she thought she had a pretty good workout and then she started to feel little short of breath and did not feel right so was concerned and then started to have numbness in her extremities, difficulty moving her hands and face and difficulty speaking so EMS was called.  That progressed to her hands feeling like he could not move and everything felt like it was alfredo and said this is never happened before.  Patient denies any new stress or anything else concerning her.  Patient is not on any oral contraception.  Patient

## 2025-06-06 NOTE — INTERVAL H&P NOTE
H&P reviewed. After examining the patient I find no changes in the patients condition since the H&P had been written.    Vitals:    06/06/25 0912   BP: 155/88   Pulse: 92   Resp: 16   Temp: 98 °F (36.7 °C)   SpO2: 98%

## 2025-06-06 NOTE — H&P
"Hand Surgery History and Physical     10:56 AM  6/6/2025  [id number]  Trina Garcia     CC:  left hand pain and numbness     HPI:  Pt is a 53 yo female who presents for follow-up evaluation of her left hand.  She has surgery in August 2024 which documents indicate was a left thumb RCL reconstruction with palmaris allograft by Dr. Lynne. Last visit a hand based splint was recommended.  Today she states the hand-based splint has worsened her thumb discomfort.  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    She is right-handed.     Past Medical History   History reviewed. No pertinent past medical history.        Past Surgical History         Past Surgical History:   Procedure Laterality Date    CARPAL TUNNEL RELEASE Right      CERVICAL FUSION N/A 12/10/2024    HAND SURGERY N/A      TOTAL HIP ARTHROPLASTY Right              Family History   History reviewed. No pertinent family history.     Allergies   Allergen Reactions    Dilaudid [Hydromorphone] GI Intolerance and Vomiting     \"A lot of vomiting\" and Nausea      Current Outpatient Medications   Medication Instructions    acetaminophen (TYLENOL) 500 mg, Every 6 hours PRN    atorvastatin (LIPITOR) 10 mg, Oral, 3 times weekly, Monday Wednesday and Friday    Cholecalciferol (VITAMIN D-3 PO) 1 tablet, Every morning    gabapentin (NEURONTIN) 1,200 mg, 3 times daily    nortriptyline (PAMELOR) 10 mg, Daily at bedtime    vitamin B-12 (VITAMIN B-12) 1,000 mcg tablet 1 tablet, Daily       Review of Systems - General ROS: negative  Ophthalmic ROS: negative  ENT ROS: negative  Respiratory ROS: negative  Cardiovascular ROS: negative  Neurological ROS: negative  Dermatological ROS: negative     Vitals:         Physical Examination:  General:  NAD  HEENT:  EOMI, perrla, normal ear morphology  Chest:  Unlabored breathing  Heart:  Regular pulse  Abd:  No distension  Extrem: LUE:  thumb MCP joint resting in flexion and ulnarly " deviated, crepitus present on manipulation of MCP joint, able to reduce joint into more normal anatomic position but then subluxes back into above position, normal thumb IP flexion and extension, no active thumb MCP extension; scar over dorsal MCP joint level, SILT  Skin:  Dry, pink  Neuro:  AAOx3      Assessment & Plan  Complete tear of radial collateral ligament of metacarpophalangeal (MCP) joint of finger  Left thumb is unstable at MCP joint.  Recommend surgery - left thumb MCP joint fusion - given her functional loss and continued pain.  Patient wishes to pursue this option.  Pt with left carpal tunnel syndrome and cubital tunnel syndrome on EMG since last visit.  Pt called and wished to add carpal tunnel and cubital tunnel releases  New consent obtained today    F/U 2 weeks after surgery.

## 2025-06-06 NOTE — OP NOTE
OPERATIVE REPORT  PATIENT NAME: Trina Garcia    :  1970  MRN: [id number]  Pt Location: [facility] OR ROOM 06    SURGERY DATE: 2025    Surgeons and Role:     * Cain Benítez MD - Primary     * Ciro Bahena PA-C - Assisting    Preop Diagnosis:  Complete tear of radial collateral ligament of metacarpophalangeal (MCP) joint of finger [S63.659A]    Post-Op Diagnosis Codes:     * Complete tear of radial collateral ligament of metacarpophalangeal (MCP) joint of finger [S63.010R]    Procedure(s):  Left - LEFT THUMB METACARPOPHALANGEAL JOINT ARTHRODESIS; LEFT CARPAL TUNNEL RELEASE & CUBITAL TUNNEL RELEASE    Specimen(s):  * No specimens in log *    Estimated Blood Loss:   Minimal    Drains:  * No LDAs found *    Anesthesia Type:   General    Operative Indications:  Complete tear of radial collateral ligament of metacarpophalangeal (MCP) joint of finger [S63.660R]      Operative Findings:  Healthy appearing nerves after release; Acceptable hardware placement       Complications:   None    Procedure and Technique:  The left upper extremity was prepped and draped in a sterile fashion. A sterile tourniquet was applied.  An esmarch was used to exsanguinate the left upper extremity, and the tourniquet was insufflated to 250 mm Hg.  A curvilinear incision was made anterior to the path of the ulnar nerve at the level of the medial elbow.  A combination of blunt and sharp dissection was performed through the subcutaneous tissues down to the level of the cubital tunnel.  The cubital tunnel was opened to expose the underlying ulnar nerve.  The release was then extended proximally and distally from the medial epicondyle for a distance 6-8 cm.  The ulnar nerve was healthy in appearance.  The wounds irrigated copiously with normal saline.  The skin was approximated with 3-0 deep dermal Vicryl followed by glue and Steri-Strips.  A field block was performed with Marcaine 0.25% plain.     A longitudinal incision was made  overlying the transverse carpal ligament in line with the ring finger in a flexed position.  Sharp dissection was performed down to the level of the transverse carpal ligament.  A Weitlaner was used for counterretraction.  The transverse carpal ligament was divided with a 15 blade scalpel distally, and tenotomy scissors proximally along with a portion of the distal antebrachial fascia.  The wound was irrigated copiously with normal saline.  The skin was approximated with 4-0 nylon in an interrupted horizontal mattress fashion.    The left thumb metacarpophalangeal joint was then approached through a longitudinal incision through her previous dorsal scar.  Combination of sharp and blunt dissection.  The tissue down to the level of the extensor mechanism.  The EPL tendon appeared to be slightly subluxed in an ulnar direction with significant dorsal scar tissue which was divided longitudinally.  The thumb metacarpal phalangeal joint was then identified and exposed.  Combination of a rongeur and a curette was used to debride the remaining cartilaginous surfaces off of the head of the metacarpal and the base of the proximal fourth phalanx to healthy cancellous bone each site.  Overall position of the thumb MCP joint was then determined under C-arm fluoroscopy images which were independently reviewed.  The K wire was then placed through the thumb metacarpal and across the MCP joint and into the proximal phalanx of the thumb.  Once acceptable overall alignment and angulation of the MCP joint have been obtained a goal depth gauge was used to determine screw length.  A 3.0 headless compression screw from [employer] was then used after being loaded on the compression screwdriver and passed through the thumb metacarpal and into the thumb proximal phalanx.  C-arm fluoroscopy was used at regular intervals confirm appropriate orientation of the arthrodesis as well as appropriate hardware placement.  The handle was removed from the  compression screwdriver and the cannulated screwdriver was passed over the K wire and the screw was seated into the cortex of the thumb metacarpal.  Good compression was noted at the MCP joint itself.  Overall position was evaluated in positions of opposition and noted to be acceptable.  Final C-arm images were obtained and independently reviewed with acceptable overall alignment compression and hardware placement.  This wound was then irrigated copiously with normal saline.  The extensor mechanism was then repaired using 3-0 Vicryl in a figure-of-eight fashion.  Skin was then approximated with 4-0 nylon in interrupted horizontal mattress fashion.  Dorsal field and volar digital block was then performed using Marcaine 0.25% plain. Xeroform was applied to the palm and thumb incisions followed by gauze and a webril over wrap. A thumb spica splint was fabricated with 3 inch orthoglass.  Ace bandages were applied from the hand to the upper arm.   Additional gauze was placed along the cubital tunnel release during the wrap.  The tourniquet was released and the patient was extubated. The patient was transferred to recovery room in stable condition.      I was present for the entire procedure.    Patient Disposition:  PACU     My Assistant was necessary throughout the procedure(s) for retraction and positioning.    I understand that section 1842 (b)(7)(D) of the Social Security Act generally prohibits Medicare physician fee schedule payment for the services of assistants-at-surgery in teaching hospitals when qualified residents are available to furnish such services. I certify that the services for which payment is claimed were medically necessary, and that no qualified resident was available to perform the services. I further understand that these services are subject to post-payment review by the Medicare carrier.       SIGNATURE: Cain Benítez MD  DATE: June 6, 2025  TIME: 12:30 PM

## 2025-06-06 NOTE — ANESTHESIA POSTPROCEDURE EVALUATION
Post-Op Assessment Note    CV Status:  Stable  Pain Score: 0    Pain management: adequate       Mental Status:  Sleepy and arousable   Hydration Status:  Euvolemic   PONV Controlled:  Controlled   Airway Patency:  Patent     Post Op Vitals Reviewed: Yes    No anethesia notable event occurred.    Staff: CRNA           Last Filed PACU Vitals:  Vitals Value Taken Time   Temp 97.9 °F (36.6 °C) 06/06/25 12:35   Pulse 81 06/06/25 12:40   /90 06/06/25 12:35   Resp 11 06/06/25 12:40   SpO2 100 % 06/06/25 12:40   Vitals shown include unfiled device data.    Modified Elieser:     Vitals Value Taken Time   Activity 2 06/06/25 12:35   Respiration 2 06/06/25 12:35   Circulation 2 06/06/25 12:35   Consciousness 0 06/06/25 12:35   Oxygen Saturation 1 06/06/25 12:35     Modified Elieser Score: 7

## 2025-06-18 ENCOUNTER — OFFICE VISIT (OUTPATIENT)
Dept: OBGYN CLINIC | Facility: CLINIC | Age: 55
End: 2025-06-18

## 2025-06-18 VITALS — WEIGHT: 130 LBS | HEIGHT: 61 IN | BODY MASS INDEX: 24.55 KG/M2

## 2025-06-18 DIAGNOSIS — S63.642D RUPTURE OF RADIAL COLLATERAL LIGAMENT OF LEFT THUMB, SUBSEQUENT ENCOUNTER: ICD-10-CM

## 2025-06-18 DIAGNOSIS — Z47.89 AFTERCARE FOLLOWING SURGERY OF THE MUSCULOSKELETAL SYSTEM: ICD-10-CM

## 2025-06-18 DIAGNOSIS — S63.629A COMPLETE TEAR OF RADIAL COLLATERAL LIGAMENT OF INTERPHALANGEAL JOINT OF THUMB: Primary | ICD-10-CM

## 2025-06-18 DIAGNOSIS — G56.02 CARPAL TUNNEL SYNDROME ON LEFT: ICD-10-CM

## 2025-06-18 DIAGNOSIS — G56.22 CUBITAL TUNNEL SYNDROME ON LEFT: ICD-10-CM

## 2025-06-18 PROCEDURE — 99024 POSTOP FOLLOW-UP VISIT: CPT | Performed by: SURGERY

## 2025-06-18 RX ORDER — NAPROXEN 500 MG/1
500 TABLET ORAL 2 TIMES DAILY WITH MEALS
Qty: 30 TABLET | Refills: 0 | Status: SHIPPED | OUTPATIENT
Start: 2025-06-18

## 2025-06-18 NOTE — PROGRESS NOTES
Name: Trina Garcia      : 1970      MRN: [id number]  Encounter Provider: Cain Benítez MD  Encounter Date: 2025   Encounter department: [facility] [place]      Assessment & Plan  Complete tear of radial collateral ligament of interphalangeal joint of thumb  Aftercare following surgery of the musculoskeletal system  Carpal tunnel syndrome on left  Cubital tunnel syndrome on left    S/p LEFT THUMB METACARPOPHALANGEAL JOINT ARTHRODESIS; LEFT CARPAL TUNNEL RELEASE & CUBITAL TUNNEL RELEASE  2025.  -Doing well.  Sutures removed.  Massage and moisturize incisional areas to soften scar.  -Remain NWB LUE in thumb spica splint fabricated today.  -Remaining finger motion encouraged.  -Rx Naproxen BID refilled.  Can alternate with over-the-counter Tylenol as needed.  -F/U 2 weeks for re-evaluation and new left thumb x-rays.  Will transition to Velcro style thumb spica brace at that time, and begin gentle active range of motion.                Chief Complaint   Patient presents with    Left Thumb - Follow-up    Follow-up     S/X         History of Present Illness   Patient is a 55 y.o. female here for postop visit.  Today she states overall she is doing well.  She has 4 out of pain in the left thumb that is manageable.  Naproxen does help.  No fever or chills.  She states she has not had a bowel movement yet but is taking Senokot and has passed gas.  She has no abdominal pain or distention.        Review of Systems A 10 point ROS was performed; negative except as noted above.   Past Surgical History[1]   Past Medical History[2]   Allergies[3]   Objective   Current Outpatient Medications   Medication Instructions    acetaminophen (TYLENOL) 500 mg, Oral, Every 6 hours PRN    atorvastatin (LIPITOR) 10 mg, 3 times weekly    Cholecalciferol (VITAMIN D-3 PO) 1 tablet, Oral, Every morning    gabapentin (NEURONTIN) 1,200 mg, Oral, 3 times daily    naproxen (NAPROSYN) 500 mg,  Oral, 2 times daily with meals    nortriptyline (PAMELOR) 10 mg, Daily at bedtime    oxyCODONE-acetaminophen (Percocet) 5-325 mg per tablet 1 tablet, Oral, Every 12 hours PRN    vitamin B-12 (VITAMIN B-12) 1,000 mcg tablet 1 tablet, Oral, Daily        Imaging  No new imaging today     Physical Exam    General appearance:  NAD   Cardiac:  Regular rate  Lungs:  Unlabored breathing  Abdomen:  Non-distended    Orthopedic Exam:    Left UE    Inspection: Medial elbow, palm, thumb incisions are all clean dry intact with scabbing.  Postoperative swelling is present.  No erythema.  No drainage.    Palpation: Mild maia-incisional tenderness.    Range-of-motion: Thumb motion deferred.  Remaining finger motion is normal.    Strength:  Deferred    Sensation:  ILT m/r/u nerve distributions.    Special Tests:  Palpable radial pulse  UE warm and well perfused.  Good cap refill at the fingertips.    Suture removal    Date/Time: 6/18/2025 10:00 AM    Performed by: Ciro Bahena PA-C  Authorized by: Cain Benítez MD    Universal Protocol:  Consent: Verbal consent obtained  Risks and benefits: risks, benefits and alternatives were discussed  Consent given by: patient  Patient identity confirmed: verbally with patient      Patient location:  Clinic  Location:     Laterality:  Left    Location:  Upper extremity    Upper extremity location:  Hand    Hand location:  L hand and L thumb  Procedure details:     Tools used:  Suture removal kit    Wound appearance:  No sign(s) of infection, good wound healing and clean  Post-procedure details:     Post-removal:  No dressing applied    Patient tolerance of procedure:  Tolerated well, no immediate complications  Splint application    Date/Time: 6/18/2025 10:00 AM    Performed by: Ciro Bahena PA-C  Authorized by: Cain Bentíez MD    Verbal consent obtained?: Yes    Risks and benefits: Risks, benefits and alternatives were discussed    Consent given by:  Patient  Patient identity confirmed:   "Verbally with patient  Pre-procedure details:     Sensation:  Normal  Procedure details:     Laterality:  Left    Location:  Wrist    Wrist:  L wrist    Splint composition: static      Splint type:  Thumb spica    Supplies:  Ortho-Glass  Post-procedure details:     Pain:  Improved    Sensation:  Normal    Patient tolerance of procedure:  Tolerated well, no immediate complications          Ciro Bahena PA-C          [1]   Past Surgical History:  Procedure Laterality Date    BRAIN SURGERY      \"stopped artery from bleeding\" after a bad fall\"    CARPAL TUNNEL RELEASE Right     CERVICAL FUSION N/A 12/10/2024    at [place]---screws implanted-and bone from hip implanted    HAND SURGERY N/A     AL ARTHRODESIS METACARPOPHALANGEAL JT W/WO INT FIXJ Left 6/6/2025    Procedure: LEFT THUMB METACARPOPHALANGEAL JOINT ARTHRODESIS, LT CTR & CUBITAL;  Surgeon: Cain Benítez MD;  Location:  MAIN OR;  Service: Orthopedics    TOTAL HIP ARTHROPLASTY Right     TUBAL LIGATION     [2]   Past Medical History:  Diagnosis Date    Arthritis     left hip    Chronic pain disorder     hands    Depression     Full dentures     upper    History of bad fall     \"hit a main artery in brain-back of head\"---approx 11-15 yrs ago--[facility] to [facility]    Hyperlipidemia     Left hip impingement syndrome     Muscle weakness     Neck pain     pt had cervical neck surgery 12/2024--and was told still has cervical disc C5-6-7 and Thoracic disc-pressing on spinal cord and monitored by neuro and regular MRI's    Spinal cord disorder (HCC)     \"neck injury--per pt not sure how it happened\"    Tinnitus     occas    Wears glasses    [3]   Allergies  Allergen Reactions    Dilaudid [Hydromorphone] GI Intolerance and Vomiting     \"A lot of vomiting\" and Nausea     "

## 2025-06-18 NOTE — ASSESSMENT & PLAN NOTE
S/p LEFT THUMB METACARPOPHALANGEAL JOINT ARTHRODESIS; LEFT CARPAL TUNNEL RELEASE & CUBITAL TUNNEL RELEASE  6/6/2025.  -Doing well.  Sutures removed.  Massage and moisturize incisional areas to soften scar.  -Remain NWB LUE in thumb spica splint fabricated today.  -Remaining finger motion encouraged.  -Rx Naproxen BID refilled.  Can alternate with over-the-counter Tylenol as needed.  -F/U 2 weeks for re-evaluation and new left thumb x-rays.  Will transition to Velcro style thumb spica brace at that time, and begin gentle active range of motion.

## 2025-06-20 DIAGNOSIS — Z47.89 AFTERCARE FOLLOWING SURGERY OF THE MUSCULOSKELETAL SYSTEM: Primary | ICD-10-CM

## 2025-06-20 RX ORDER — TRAMADOL HYDROCHLORIDE 50 MG/1
50 TABLET ORAL EVERY 12 HOURS PRN
Qty: 10 TABLET | Refills: 0 | Status: SHIPPED | OUTPATIENT
Start: 2025-06-20

## 2025-06-30 DIAGNOSIS — Z47.89 AFTERCARE FOLLOWING SURGERY OF THE MUSCULOSKELETAL SYSTEM: Primary | ICD-10-CM

## 2025-06-30 RX ORDER — SULFAMETHOXAZOLE AND TRIMETHOPRIM 800; 160 MG/1; MG/1
1 TABLET ORAL 2 TIMES DAILY
Qty: 10 TABLET | Refills: 0 | Status: SHIPPED | OUTPATIENT
Start: 2025-06-30 | End: 2025-07-05

## 2025-07-01 ENCOUNTER — OFFICE VISIT (OUTPATIENT)
Dept: OBGYN CLINIC | Facility: CLINIC | Age: 55
End: 2025-07-01

## 2025-07-01 ENCOUNTER — APPOINTMENT (OUTPATIENT)
Dept: RADIOLOGY | Facility: CLINIC | Age: 55
End: 2025-07-01
Attending: PHYSICIAN ASSISTANT
Payer: COMMERCIAL

## 2025-07-01 VITALS — BODY MASS INDEX: 24.55 KG/M2 | HEIGHT: 61 IN | WEIGHT: 130 LBS

## 2025-07-01 DIAGNOSIS — S63.629A COMPLETE TEAR OF RADIAL COLLATERAL LIGAMENT OF INTERPHALANGEAL JOINT OF THUMB: ICD-10-CM

## 2025-07-01 DIAGNOSIS — Z47.89 AFTERCARE FOLLOWING SURGERY OF THE MUSCULOSKELETAL SYSTEM: ICD-10-CM

## 2025-07-01 DIAGNOSIS — S63.629A COMPLETE TEAR OF RADIAL COLLATERAL LIGAMENT OF INTERPHALANGEAL JOINT OF THUMB: Primary | ICD-10-CM

## 2025-07-01 PROCEDURE — 99024 POSTOP FOLLOW-UP VISIT: CPT | Performed by: PHYSICIAN ASSISTANT

## 2025-07-01 PROCEDURE — 73140 X-RAY EXAM OF FINGER(S): CPT

## 2025-07-01 NOTE — ASSESSMENT & PLAN NOTE
S/p LEFT THUMB METACARPOPHALANGEAL JOINT ARTHRODESIS; LEFT CARPAL TUNNEL RELEASE & CUBITAL TUNNEL RELEASE  6/6/2025.   -Doing well.  X-rays today demonstrate stable fusion.  -Remain NWB L Hand, but can now transition to left thumb spica brace (patient has her own) that may be removed for hygiene and gentle active ROM exercises demonstrated today.  -Continue 5-day course of Bactrim to completion for cellulitis.  -Tylenol and NSAIDs PRN for pain.  -F/U 2 weeks for re-evaluation.  -Call the office with any questions or concerns.

## 2025-07-01 NOTE — PROGRESS NOTES
Name: Trina Garcia      : 1970      MRN: [id number]  Encounter Provider: Ciro Bahena PA-C  Encounter Date: 2025   Encounter department: [facility] [place]      Assessment & Plan  Complete tear of radial collateral ligament of interphalangeal joint of thumb  Aftercare following surgery of the musculoskeletal system  S/p LEFT THUMB METACARPOPHALANGEAL JOINT ARTHRODESIS; LEFT CARPAL TUNNEL RELEASE & CUBITAL TUNNEL RELEASE  2025.   -Doing well.  X-rays today demonstrate stable fusion.  -Remain NWB L Hand, but can now transition to left thumb spica brace (patient has her own) that may be removed for hygiene and gentle active ROM exercises demonstrated today.  -Continue 5-day course of Bactrim to completion for cellulitis.  -Tylenol and NSAIDs PRN for pain.  -F/U 2 weeks for re-evaluation.  -Call the office with any questions or concerns.              Chief Complaint   Patient presents with    Left Thumb - Follow-up       History of Present Illness   Patient is a 55 y.o. female here for postop visit.  Today she states overall she is doing well, and notes significant improvement in pre-operative pain.  She does note skin infection which has already improved on oral antibiotics.  No fever or chills.  Has some continued intermittent dull numbness of the dorsal thumb which has is not new.      Review of Systems A 10 point ROS was performed; negative except as noted above.   Past Surgical History[1]   Past Medical History[2]   Allergies[3]   Objective   Current Outpatient Medications   Medication Instructions    acetaminophen (TYLENOL) 500 mg, Every 6 hours PRN    atorvastatin (LIPITOR) 10 mg, 3 times weekly    Cholecalciferol (VITAMIN D-3 PO) 1 tablet, Every morning    gabapentin (NEURONTIN) 1,200 mg, 3 times daily    naproxen (NAPROSYN) 500 mg, Oral, 2 times daily with meals    nortriptyline (PAMELOR) 10 mg, Daily at bedtime    oxyCODONE-acetaminophen (Percocet)  "5-325 mg per tablet 1 tablet, Oral, Every 12 hours PRN    sulfamethoxazole-trimethoprim (BACTRIM DS) 800-160 mg per tablet 1 tablet, Oral, 2 times daily    traMADol (ULTRAM) 50 mg, Oral, Every 12 hours PRN    vitamin B-12 (VITAMIN B-12) 1,000 mcg tablet 1 tablet, Daily        Imaging  Left thumb x-rays done in the office today 7/1/2025 demonstrate stable MCP joint fusion.    Physical Exam    General appearance:  NAD   Cardiac:  Regular rate  Lungs:  Unlabored breathing  Abdomen:  Non-distended    Orthopedic Exam:    Left UE    Inspection: Incisions are healed.  + Cellulitis dorsal thumb, no open wounds, no drainage.    Palpation: No maia-incisional fluctuance, no expressed fluid    Range-of-motion: + active CMC and IP joint motion.  Remaining finger motion is normal.    Strength:  Deferred    Sensation:  ILT m/r/u nerve distributions.    Special Tests:  Palpable radial pulse  UE warm and well perfused.  Good cap refill at the fingertips.          Ciro Bahena PA-C          [1]   Past Surgical History:  Procedure Laterality Date    BRAIN SURGERY      \"stopped artery from bleeding\" after a bad fall\"    CARPAL TUNNEL RELEASE Right     CERVICAL FUSION N/A 12/10/2024    at [facility]---screws implanted-and bone from hip implanted    HAND SURGERY N/A     MD ARTHRODESIS METACARPOPHALANGEAL JT W/WO INT FIXJ Left 6/6/2025    Procedure: LEFT THUMB METACARPOPHALANGEAL JOINT ARTHRODESIS, LT CTR & CUBITAL;  Surgeon: Cain Benítez MD;  Location:  MAIN OR;  Service: Orthopedics    TOTAL HIP ARTHROPLASTY Right     TUBAL LIGATION     [2]   Past Medical History:  Diagnosis Date    Arthritis     left hip    Chronic pain disorder     hands    Depression     Full dentures     upper    History of bad fall     \"hit a main artery in brain-back of head\"---approx 11-15 yrs ago--[facility]ivac to [facility]    Hyperlipidemia     Left hip impingement syndrome     Muscle weakness     Neck pain     pt had cervical neck surgery 12/2024--and was told " "still has cervical disc C5-6-7 and Thoracic disc-pressing on spinal cord and monitored by neuro and regular MRI's    Spinal cord disorder (HCC)     \"neck injury--per pt not sure how it happened\"    Tinnitus     occas    Wears glasses    [3]   Allergies  Allergen Reactions    Dilaudid [Hydromorphone] GI Intolerance and Vomiting     \"A lot of vomiting\" and Nausea     "

## 2025-07-16 ENCOUNTER — OFFICE VISIT (OUTPATIENT)
Dept: OBGYN CLINIC | Facility: CLINIC | Age: 55
End: 2025-07-16

## 2025-07-16 VITALS — HEIGHT: 61 IN | BODY MASS INDEX: 24.88 KG/M2 | WEIGHT: 131.8 LBS

## 2025-07-16 DIAGNOSIS — Z47.89 AFTERCARE FOLLOWING SURGERY OF THE MUSCULOSKELETAL SYSTEM: ICD-10-CM

## 2025-07-16 DIAGNOSIS — G56.22 CUBITAL TUNNEL SYNDROME ON LEFT: ICD-10-CM

## 2025-07-16 DIAGNOSIS — S63.629A COMPLETE TEAR OF RADIAL COLLATERAL LIGAMENT OF INTERPHALANGEAL JOINT OF THUMB: ICD-10-CM

## 2025-07-16 DIAGNOSIS — G56.02 CARPAL TUNNEL SYNDROME ON LEFT: Primary | ICD-10-CM

## 2025-07-16 PROCEDURE — 99024 POSTOP FOLLOW-UP VISIT: CPT | Performed by: SURGERY

## 2025-07-16 RX ORDER — CICLOPIROX 80 MG/ML
SOLUTION TOPICAL
COMMUNITY
Start: 2025-07-14

## 2025-07-16 RX ORDER — TERBINAFINE HYDROCHLORIDE 250 MG/1
250 TABLET ORAL 2 TIMES DAILY
COMMUNITY
Start: 2025-07-14

## 2025-07-16 NOTE — PROGRESS NOTES
Name: Trina Garcia      : 1970      MRN: [id number]  Encounter Provider: Cain Benítez MD  Encounter Date: 2025   Encounter department: [facility] [place]      Assessment & Plan  Carpal tunnel syndrome on left  Complete tear of radial collateral ligament of interphalangeal joint of thumb  Cubital tunnel syndrome on left  Aftercare following surgery of the musculoskeletal system  Physical exam was performed and findings are consistent with the above diagnosis. Findings were discussed  Recommend weaning from splint. May remove to bathe  May use NSAIDs/Tylenol for pain control  May use ice or heat  Encouraged finger motion  Activities as tolerated  Light activity. No heavy lifting, pushing, pulling, or weight bearing  It is recommended they follow up 4-6 weeks                    Chief Complaint   Patient presents with    Left Thumb - Follow-up       History of Present Illness   Patient is a 55 y.o. female here for postop visit.  Today she states overall she is very sore but has been more active. She does feel the brace is also a cause of her soreness so she does remove it now and then. She has noted improvement in her ADL's  Dorsal thumb numbness is still present and the finger tips are also still numb and painful. She states that could be due to the cervical issues     Review of Systems A 10 point ROS was performed; negative except as noted above.   Past Surgical History[1]   Past Medical History[2]   Allergies[3]   Objective   Current Outpatient Medications   Medication Instructions    acetaminophen (TYLENOL) 500 mg, Every 6 hours PRN    atorvastatin (LIPITOR) 10 mg, 3 times weekly    Cholecalciferol (VITAMIN D-3 PO) 1 tablet, Every morning    ciclopirox (PENLAC) 8 % solution     gabapentin (NEURONTIN) 1,200 mg, 3 times daily    naproxen (NAPROSYN) 500 mg, Oral, 2 times daily with meals    nortriptyline (PAMELOR) 10 mg, Oral, Daily at bedtime, Takes 2 tabs      "oxyCODONE-acetaminophen (Percocet) 5-325 mg per tablet 1 tablet, Oral, Every 12 hours PRN    terbinafine (LAMISIL) 250 mg, Oral, 2 times daily, for 7 consecutive days of every month for 3 months    traMADol (ULTRAM) 50 mg, Oral, Every 12 hours PRN    vitamin B-12 (VITAMIN B-12) 1,000 mcg tablet 1 tablet, Daily        Imaging  Left thumb x-rays done in the office today 7/1/2025 demonstrate stable MCP joint fusion.    Physical Exam    General appearance:  NAD   Cardiac:  Regular rate  Lungs:  Unlabored breathing  Abdomen:  Non-distended    Orthopedic Exam:    Left UE    Inspection: Incisions are healed.  + Cellulitis dorsal thumb, no open wounds, no drainage.    Palpation: No maia-incisional fluctuance, no expressed fluid    Range-of-motion:  Improved CMC and IP joint motion.      Strength:  Deferred    Sensation:  ILT m/r/u nerve distributions.    Special Tests:  Palpable radial pulse  UE warm and well perfused.  Good cap refill at the fingertips.          Cain Benítez MD          [1]   Past Surgical History:  Procedure Laterality Date    BRAIN SURGERY      \"stopped artery from bleeding\" after a bad fall\"    CARPAL TUNNEL RELEASE Right     CERVICAL FUSION N/A 12/10/2024    at [place]---screws implanted-and bone from hip implanted    HAND SURGERY N/A     MT ARTHRODESIS METACARPOPHALANGEAL JT W/WO INT FIXJ Left 6/6/2025    Procedure: LEFT THUMB METACARPOPHALANGEAL JOINT ARTHRODESIS, LT CTR & CUBITAL;  Surgeon: Cain Benítez MD;  Location: [facility];  Service: Orthopedics    TOTAL HIP ARTHROPLASTY Right     TUBAL LIGATION     [2]   Past Medical History:  Diagnosis Date    Arthritis     left hip    Chronic pain disorder     hands    Depression     Full dentures     upper    History of bad fall     \"hit a main artery in brain-back of head\"---approx 11-15 yrs ago--[facility]ivac to [facility]    Hyperlipidemia     Left hip impingement syndrome     Muscle weakness     Neck pain     pt had cervical neck surgery 12/2024--and was told " "still has cervical disc C5-6-7 and Thoracic disc-pressing on spinal cord and monitored by neuro and regular MRI's    Spinal cord disorder (HCC)     \"neck injury--per pt not sure how it happened\"    Tinnitus     occas    Wears glasses    [3]   Allergies  Allergen Reactions    Dilaudid [Hydromorphone] GI Intolerance and Vomiting     \"A lot of vomiting\" and Nausea     "

## 2025-07-16 NOTE — ASSESSMENT & PLAN NOTE
Physical exam was performed and findings are consistent with the above diagnosis. Findings were discussed  Recommend weaning from splint. May remove to bathe  May use NSAIDs/Tylenol for pain control  May use ice or heat  Encouraged finger motion  Activities as tolerated  Light activity. No heavy lifting, pushing, pulling, or weight bearing  It is recommended they follow up 4-6 weeks

## 2025-08-20 ENCOUNTER — OFFICE VISIT (OUTPATIENT)
Dept: OBGYN CLINIC | Facility: CLINIC | Age: 55
End: 2025-08-20

## 2025-08-20 VITALS — BODY MASS INDEX: 24.73 KG/M2 | HEIGHT: 61 IN | WEIGHT: 131 LBS

## 2025-08-20 DIAGNOSIS — Z47.89 AFTERCARE FOLLOWING SURGERY OF THE MUSCULOSKELETAL SYSTEM: Primary | ICD-10-CM

## 2025-08-20 DIAGNOSIS — G56.21 CUBITAL TUNNEL SYNDROME ON RIGHT: ICD-10-CM

## 2025-08-20 DIAGNOSIS — G56.01 CARPAL TUNNEL SYNDROME ON RIGHT: ICD-10-CM

## 2025-08-20 PROCEDURE — 99024 POSTOP FOLLOW-UP VISIT: CPT | Performed by: SURGERY

## 2025-08-20 RX ORDER — SODIUM CHLORIDE, SODIUM LACTATE, POTASSIUM CHLORIDE, CALCIUM CHLORIDE 600; 310; 30; 20 MG/100ML; MG/100ML; MG/100ML; MG/100ML
20 INJECTION, SOLUTION INTRAVENOUS CONTINUOUS
OUTPATIENT
Start: 2025-08-20

## (undated) DEVICE — 3M™ STERI-STRIP™ REINFORCED ADHESIVE SKIN CLOSURES, R1547, 1/2 IN X 4 IN (12 MM X 100 MM), 6 STRIPS/ENVELOPE: Brand: 3M™ STERI-STRIP™

## (undated) DEVICE — GLOVE INDICATOR PI UNDERGLOVE SZ 7.5 BLUE

## (undated) DEVICE — CAST PADDING 4 IN SYNTHETIC NON-STRL

## (undated) DEVICE — 1.1MM NON-THREADED GUIDE WIRE 150MM
Type: IMPLANTABLE DEVICE | Site: FINGER | Status: NON-FUNCTIONAL
Removed: 2025-06-06

## (undated) DEVICE — INTENDED FOR TISSUE SEPARATION, AND OTHER PROCEDURES THAT REQUIRE A SHARP SURGICAL BLADE TO PUNCTURE OR CUT.: Brand: BARD-PARKER ® CARBON RIB-BACK BLADES

## (undated) DEVICE — EXOFIN PRECISION PEN HIGH VISCOSITY TOPICAL SKIN ADHESIVE: Brand: EXOFIN PRECISION PEN, 1G

## (undated) DEVICE — GLOVE INDICATOR PI UNDERGLOVE SZ 8 BLUE

## (undated) DEVICE — CUFF TOURNIQUET 18 X 4 IN QUICK CONNECT DISP 1 BLADDER

## (undated) DEVICE — STERILE BETHLEHEM PLASTIC HAND: Brand: CARDINAL HEALTH

## (undated) DEVICE — NEEDLE 25G X 1 1/2

## (undated) DEVICE — GLOVE SRG BIOGEL 7.5

## (undated) DEVICE — C-ARM: Brand: UNBRANDED